# Patient Record
Sex: FEMALE | Race: WHITE | NOT HISPANIC OR LATINO | URBAN - NONMETROPOLITAN AREA
[De-identification: names, ages, dates, MRNs, and addresses within clinical notes are randomized per-mention and may not be internally consistent; named-entity substitution may affect disease eponyms.]

---

## 2024-08-01 ENCOUNTER — OFFICE VISIT (OUTPATIENT)
Dept: URBAN - NONMETROPOLITAN AREA CLINIC 4 | Facility: CLINIC | Age: 39
End: 2024-08-01
Payer: COMMERCIAL

## 2024-08-01 VITALS
WEIGHT: 154 LBS | SYSTOLIC BLOOD PRESSURE: 119 MMHG | BODY MASS INDEX: 27.29 KG/M2 | DIASTOLIC BLOOD PRESSURE: 71 MMHG | TEMPERATURE: 98.7 F | HEART RATE: 93 BPM | HEIGHT: 63 IN

## 2024-08-01 DIAGNOSIS — R21 PERIANAL RASH: ICD-10-CM

## 2024-08-01 DIAGNOSIS — L29.0 PERIANAL PRURITUS: ICD-10-CM

## 2024-08-01 DIAGNOSIS — K59.09 CHRONIC CONSTIPATION: ICD-10-CM

## 2024-08-01 DIAGNOSIS — R23.4 SKIN FISSURE: ICD-10-CM

## 2024-08-01 PROCEDURE — 99204 OFFICE O/P NEW MOD 45 MIN: CPT | Performed by: PHYSICIAN ASSISTANT

## 2024-08-01 PROCEDURE — 99244 OFF/OP CNSLTJ NEW/EST MOD 40: CPT | Performed by: PHYSICIAN ASSISTANT

## 2024-08-01 RX ORDER — RIMEGEPANT SULFATE 75 MG/75MG
1 TABLET ON THE TONGUE AND ALLOW TO DISSOLVE TABLET, ORALLY DISINTEGRATING ORAL
Status: ACTIVE | COMMUNITY

## 2024-08-01 RX ORDER — SUMATRIPTAN SUCCINATE 100 MG/1
1 TABLET AT LEAST 2 HOURS BETWEEN DOSES AS NEEDED TABLET, FILM COATED ORAL TWICE A DAY
Status: ACTIVE | COMMUNITY

## 2024-08-01 NOTE — HPI-TODAY'S VISIT:
Genny is 39 years old she has past medical history of drug reconderary, as well as migraines and she's here today secondary to rectal pain and rectal bleeding which she states has occurred indomitantly over the past one year.  No family history of colon cancer, she denies any abdominal pain or upper gi symptoms. She also endorses constipation, which is ongoing for some time, and the stools are normally large and quite firm.  She believes she had a colonoscopy about six or seven years ago, she is unsure of those results

## 2024-08-01 NOTE — PHYSICAL EXAM RECTAL:
,  no external hemorrhoids, perianal skin irritation noted  a skin tear is noted at 12 o clock, which goes anteriorly toward the vagina.  It is tender upon palpation

## 2024-08-06 ENCOUNTER — TELEPHONE ENCOUNTER (OUTPATIENT)
Dept: URBAN - NONMETROPOLITAN AREA CLINIC 4 | Facility: CLINIC | Age: 39
End: 2024-08-06

## 2024-08-14 ENCOUNTER — OFFICE VISIT (OUTPATIENT)
Dept: URBAN - NONMETROPOLITAN AREA CLINIC 4 | Facility: CLINIC | Age: 39
End: 2024-08-14
Payer: COMMERCIAL

## 2024-08-14 VITALS
WEIGHT: 153 LBS | TEMPERATURE: 98.8 F | HEIGHT: 63 IN | BODY MASS INDEX: 27.11 KG/M2 | DIASTOLIC BLOOD PRESSURE: 88 MMHG | HEART RATE: 79 BPM | SYSTOLIC BLOOD PRESSURE: 142 MMHG

## 2024-08-14 DIAGNOSIS — R23.4 SKIN FISSURE: ICD-10-CM

## 2024-08-14 DIAGNOSIS — R21 PERIANAL RASH: ICD-10-CM

## 2024-08-14 DIAGNOSIS — L29.0 PERIANAL PRURITUS: ICD-10-CM

## 2024-08-14 DIAGNOSIS — K59.09 CHRONIC CONSTIPATION: ICD-10-CM

## 2024-08-14 PROCEDURE — 99214 OFFICE O/P EST MOD 30 MIN: CPT | Performed by: PHYSICIAN ASSISTANT

## 2024-08-14 RX ORDER — RIMEGEPANT SULFATE 75 MG/75MG
1 TABLET ON THE TONGUE AND ALLOW TO DISSOLVE TABLET, ORALLY DISINTEGRATING ORAL
Status: ACTIVE | COMMUNITY

## 2024-08-14 RX ORDER — SUMATRIPTAN SUCCINATE 100 MG/1
1 TABLET AT LEAST 2 HOURS BETWEEN DOSES AS NEEDED TABLET, FILM COATED ORAL TWICE A DAY
Status: ACTIVE | COMMUNITY

## 2024-08-14 NOTE — HPI-TODAY'S VISIT:
Genny is 39 years old she has past medical history of drug reconderary, as well as migraines and she's here today secondary to rectal pain and rectal bleeding which she states has occurred indomitantly over the past one year.  No family history of colon cancer, she denies any abdominal pain or upper gi symptoms. She also endorses constipation, which is ongoing for some time, and the stools are normally large and quite firm.  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  Also, used nitro cream for 3 days, had headaches

## 2024-08-28 ENCOUNTER — OFFICE VISIT (OUTPATIENT)
Dept: URBAN - NONMETROPOLITAN AREA CLINIC 4 | Facility: CLINIC | Age: 39
End: 2024-08-28
Payer: COMMERCIAL

## 2024-08-28 ENCOUNTER — DASHBOARD ENCOUNTERS (OUTPATIENT)
Age: 39
End: 2024-08-28

## 2024-08-28 VITALS
WEIGHT: 153 LBS | DIASTOLIC BLOOD PRESSURE: 84 MMHG | BODY MASS INDEX: 27.11 KG/M2 | TEMPERATURE: 98.5 F | HEIGHT: 63 IN | HEART RATE: 84 BPM | SYSTOLIC BLOOD PRESSURE: 115 MMHG

## 2024-08-28 DIAGNOSIS — L29.0 PERIANAL PRURITUS: ICD-10-CM

## 2024-08-28 DIAGNOSIS — R21 PERIANAL RASH: ICD-10-CM

## 2024-08-28 DIAGNOSIS — R23.4 SKIN FISSURE: ICD-10-CM

## 2024-08-28 DIAGNOSIS — K59.09 CHRONIC CONSTIPATION: ICD-10-CM

## 2024-08-28 PROCEDURE — 99214 OFFICE O/P EST MOD 30 MIN: CPT | Performed by: PHYSICIAN ASSISTANT

## 2024-08-28 RX ORDER — RIMEGEPANT SULFATE 75 MG/75MG
1 TABLET ON THE TONGUE AND ALLOW TO DISSOLVE TABLET, ORALLY DISINTEGRATING ORAL
Status: ACTIVE | COMMUNITY

## 2024-08-28 RX ORDER — SUMATRIPTAN SUCCINATE 100 MG/1
1 TABLET AT LEAST 2 HOURS BETWEEN DOSES AS NEEDED TABLET, FILM COATED ORAL TWICE A DAY
Status: ACTIVE | COMMUNITY

## 2024-08-28 NOTE — HPI-TODAY'S VISIT:
Genny is 39 years old she has past medical history of drug reconderary, as well as migraines and she's here today secondary to rectal pain and rectal bleeding which she states has occurred indomitantly over the past one year.  No family history of colon cancer, she denies any abdominal pain or upper gi symptoms. She also endorses constipation, which is ongoing for some time, and the stools are normally large and quite firm.  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  Also, used nitro cream for 3 days, had headaches  8.28.24 complete resolution of fissure with Diltiazem cream! With anti-fungal cream, no further skin irritation

## 2024-09-09 ENCOUNTER — ERX REFILL RESPONSE (OUTPATIENT)
Dept: URBAN - NONMETROPOLITAN AREA CLINIC 4 | Facility: CLINIC | Age: 39
End: 2024-09-09

## 2024-09-09 RX ORDER — CLOTRIMAZOLE 10 MG/G
APPLY TO AFFECTED AREA(S) TWO TIMES A DAY FOR 14 DAYS OINTMENT TOPICAL
Qty: 56.7 GRAM | Refills: 0 | OUTPATIENT

## 2024-10-14 ENCOUNTER — ERX REFILL RESPONSE (OUTPATIENT)
Dept: URBAN - NONMETROPOLITAN AREA CLINIC 4 | Facility: CLINIC | Age: 39
End: 2024-10-14

## 2024-10-14 RX ORDER — CLOTRIMAZOLE 10 MG/G
APPLY TO AFFECTED AREA(S) TWO TIMES A DAY FOR 14 DAYS OINTMENT TOPICAL
Qty: 56.7 GRAM | Refills: 0 | OUTPATIENT

## 2025-03-11 ENCOUNTER — WEB ENCOUNTER (OUTPATIENT)
Dept: URBAN - NONMETROPOLITAN AREA CLINIC 4 | Facility: CLINIC | Age: 40
End: 2025-03-11

## 2025-03-11 ENCOUNTER — OFFICE VISIT (OUTPATIENT)
Dept: URBAN - NONMETROPOLITAN AREA CLINIC 4 | Facility: CLINIC | Age: 40
End: 2025-03-11
Payer: COMMERCIAL

## 2025-03-11 VITALS — TEMPERATURE: 98.7 F | WEIGHT: 165.4 LBS | BODY MASS INDEX: 29.3 KG/M2 | HEIGHT: 63 IN

## 2025-03-11 DIAGNOSIS — R21 PERIANAL RASH: ICD-10-CM

## 2025-03-11 DIAGNOSIS — R10.84 GENERALIZED POSTPRANDIAL ABDOMINAL PAIN: ICD-10-CM

## 2025-03-11 DIAGNOSIS — K60.2 ANAL FISSURE: ICD-10-CM

## 2025-03-11 DIAGNOSIS — K58.0 IRRITABLE BOWEL SYNDROME WITH DIARRHEA: ICD-10-CM

## 2025-03-11 PROBLEM — 197125005: Status: ACTIVE | Noted: 2025-03-11

## 2025-03-11 PROCEDURE — 99214 OFFICE O/P EST MOD 30 MIN: CPT | Performed by: REGISTERED NURSE

## 2025-03-11 RX ORDER — RIMEGEPANT SULFATE 75 MG/75MG
1 TABLET ON THE TONGUE AND ALLOW TO DISSOLVE TABLET, ORALLY DISINTEGRATING ORAL
Status: ACTIVE | COMMUNITY

## 2025-03-11 RX ORDER — DICYCLOMINE HYDROCHLORIDE 20 MG/1
1 TABLET TABLET ORAL THREE TIMES A DAY
Status: ACTIVE | COMMUNITY

## 2025-03-11 RX ORDER — CLOTRIMAZOLE 10 MG/G
APPLY TO AFFECTED AREA(S) TWO TIMES A DAY FOR 14 DAYS OINTMENT TOPICAL
Qty: 56.7 GRAM | Refills: 0 | Status: DISCONTINUED | COMMUNITY

## 2025-03-11 RX ORDER — SUMATRIPTAN SUCCINATE 100 MG/1
1 TABLET AT LEAST 2 HOURS BETWEEN DOSES AS NEEDED TABLET, FILM COATED ORAL TWICE A DAY
Status: ACTIVE | COMMUNITY

## 2025-03-11 NOTE — HPI-TODAY'S VISIT:
Genny is 39 years old she has past medical history of drug reconderary, as well as migraines and she's here today secondary to rectal pain and rectal bleeding which she states has occurred indomitantly over the past one year.  No family history of colon cancer, she denies any abdominal pain or upper gi symptoms. She also endorses constipation, which is ongoing for some time, and the stools are normally large and quite firm.  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  Also, used nitro cream for 3 days, had headaches  8.28.24 complete resolution of fissure with Diltiazem cream! With anti-fungal cream, no further skin irritation  3/11/25: Pt RTC with c/o postprandial diarrhea and cramping since February. States she had symptoms initially after eating seafood. She went to UC and was precscribed Flagyl and Azithromycin, which helped somewhat. She had seafood again a week later and had the same symptoms. Since then, she has continued to have diarrhe and abdominal crmaping after anything she eats. Having up to 10 BMs daily. She took a Bentyl today. Reports associated rectal pain and skin irritation. Denies hematochezia.

## 2025-03-12 ENCOUNTER — WEB ENCOUNTER (OUTPATIENT)
Dept: URBAN - NONMETROPOLITAN AREA CLINIC 4 | Facility: CLINIC | Age: 40
End: 2025-03-12

## 2025-03-12 ENCOUNTER — TELEPHONE ENCOUNTER (OUTPATIENT)
Dept: URBAN - NONMETROPOLITAN AREA CLINIC 4 | Facility: CLINIC | Age: 40
End: 2025-03-12

## 2025-03-31 ENCOUNTER — WEB ENCOUNTER (OUTPATIENT)
Dept: URBAN - NONMETROPOLITAN AREA CLINIC 4 | Facility: CLINIC | Age: 40
End: 2025-03-31

## 2025-04-01 ENCOUNTER — LAB OUTSIDE AN ENCOUNTER (OUTPATIENT)
Dept: URBAN - NONMETROPOLITAN AREA CLINIC 4 | Facility: CLINIC | Age: 40
End: 2025-04-01

## 2025-04-08 ENCOUNTER — OFFICE VISIT (OUTPATIENT)
Dept: URBAN - NONMETROPOLITAN AREA CLINIC 4 | Facility: CLINIC | Age: 40
End: 2025-04-08

## 2025-04-08 RX ORDER — RIMEGEPANT SULFATE 75 MG/75MG
1 TABLET ON THE TONGUE AND ALLOW TO DISSOLVE TABLET, ORALLY DISINTEGRATING ORAL
Status: ACTIVE | COMMUNITY

## 2025-04-08 RX ORDER — SUMATRIPTAN SUCCINATE 100 MG/1
1 TABLET AT LEAST 2 HOURS BETWEEN DOSES AS NEEDED TABLET, FILM COATED ORAL TWICE A DAY
Status: ACTIVE | COMMUNITY

## 2025-04-08 RX ORDER — DICYCLOMINE HYDROCHLORIDE 20 MG/1
1 TABLET TABLET ORAL THREE TIMES A DAY
Status: ACTIVE | COMMUNITY

## 2025-06-19 ENCOUNTER — CLAIMS CREATED FROM THE CLAIM WINDOW (OUTPATIENT)
Dept: URBAN - METROPOLITAN AREA SURGERY CENTER 14 | Facility: SURGERY CENTER | Age: 40
End: 2025-06-19
Payer: COMMERCIAL

## 2025-06-19 ENCOUNTER — CLAIMS CREATED FROM THE CLAIM WINDOW (OUTPATIENT)
Dept: URBAN - METROPOLITAN AREA CLINIC 4 | Facility: CLINIC | Age: 40
End: 2025-06-19
Payer: COMMERCIAL

## 2025-06-19 ENCOUNTER — OFFICE VISIT (OUTPATIENT)
Dept: URBAN - METROPOLITAN AREA SURGERY CENTER 14 | Facility: SURGERY CENTER | Age: 40
End: 2025-06-19

## 2025-06-19 DIAGNOSIS — K29.70 GASTRITIS WITHOUT BLEEDING, UNSPECIFIED CHRONICITY, UNSPECIFIED GASTRITIS TYPE: ICD-10-CM

## 2025-06-19 DIAGNOSIS — K63.89 OTHER SPECIFIED DISEASES OF INTESTINE: ICD-10-CM

## 2025-06-19 DIAGNOSIS — K29.70 GASTRITIS, UNSPECIFIED, WITHOUT BLEEDING: ICD-10-CM

## 2025-06-19 DIAGNOSIS — K62.5 ANAL BLEEDING: ICD-10-CM

## 2025-06-19 DIAGNOSIS — R19.7 ACUTE DIARRHEA: ICD-10-CM

## 2025-06-19 DIAGNOSIS — K29.80 ACUTE DUODENITIS: ICD-10-CM

## 2025-06-19 DIAGNOSIS — K21.00 GASTROESOPHAGEAL REFLUX DISEASE WITH ESOPHAGITIS WITHOUT HEMORRHAGE: ICD-10-CM

## 2025-06-19 DIAGNOSIS — R10.13 ABDOMINAL DISCOMFORT, EPIGASTRIC: ICD-10-CM

## 2025-06-19 DIAGNOSIS — K29.80 DUODENITIS WITHOUT BLEEDING: ICD-10-CM

## 2025-06-19 PROBLEM — 266433003: Status: ACTIVE | Noted: 2025-06-19

## 2025-06-19 PROCEDURE — 43239 EGD BIOPSY SINGLE/MULTIPLE: CPT | Performed by: HOSPITALIST

## 2025-06-19 PROCEDURE — 45380 COLONOSCOPY AND BIOPSY: CPT | Performed by: HOSPITALIST

## 2025-06-19 PROCEDURE — 00813 ANES UPR LWR GI NDSC PX: CPT | Performed by: NURSE ANESTHETIST, CERTIFIED REGISTERED

## 2025-06-19 PROCEDURE — 88305 TISSUE EXAM BY PATHOLOGIST: CPT | Performed by: PATHOLOGY

## 2025-06-19 PROCEDURE — 88342 IMHCHEM/IMCYTCHM 1ST ANTB: CPT | Performed by: PATHOLOGY

## 2025-06-19 RX ORDER — RIMEGEPANT SULFATE 75 MG/75MG
1 TABLET ON THE TONGUE AND ALLOW TO DISSOLVE TABLET, ORALLY DISINTEGRATING ORAL
Status: ACTIVE | COMMUNITY

## 2025-06-19 RX ORDER — OMEPRAZOLE 40 MG/1
1 CAPSULE 1/2 TO 1 HOUR BEFORE MORNING MEAL CAPSULE, DELAYED RELEASE ORAL ONCE A DAY
Qty: 30 | Refills: 1 | OUTPATIENT
Start: 2025-06-19

## 2025-06-19 RX ORDER — SUMATRIPTAN SUCCINATE 100 MG/1
1 TABLET AT LEAST 2 HOURS BETWEEN DOSES AS NEEDED TABLET, FILM COATED ORAL TWICE A DAY
Status: ACTIVE | COMMUNITY

## 2025-06-19 RX ORDER — DICYCLOMINE HYDROCHLORIDE 20 MG/1
1 TABLET TABLET ORAL THREE TIMES A DAY
Status: ACTIVE | COMMUNITY